# Patient Record
Sex: FEMALE | Race: ASIAN | NOT HISPANIC OR LATINO | Employment: UNEMPLOYED | URBAN - METROPOLITAN AREA
[De-identification: names, ages, dates, MRNs, and addresses within clinical notes are randomized per-mention and may not be internally consistent; named-entity substitution may affect disease eponyms.]

---

## 2017-01-05 ENCOUNTER — ROUTINE PRENATAL (OUTPATIENT)
Dept: OBGYN | Facility: CLINIC | Age: 32
End: 2017-01-05

## 2017-01-05 VITALS — SYSTOLIC BLOOD PRESSURE: 122 MMHG | DIASTOLIC BLOOD PRESSURE: 70 MMHG | WEIGHT: 139 LBS | BODY MASS INDEX: 25.42 KG/M2

## 2017-01-05 DIAGNOSIS — Z34.82 ENCOUNTER FOR SUPERVISION OF OTHER NORMAL PREGNANCY IN SECOND TRIMESTER: ICD-10-CM

## 2017-01-05 PROCEDURE — 90040 PR PRENATAL FOLLOW UP: CPT | Performed by: NURSE PRACTITIONER

## 2017-01-05 NOTE — MR AVS SNAPSHOT
Felisha Mendoza   2017 8:30 AM   Routine Prenatal   MRN: [id number]    [facility]   Dept Phone:  [phone]    Description:  Female : 1985   Provider:  Marybel Hackett D.N.P.           Allergies as of 2017     No Known Allergies      Vital Signs     Blood Pressure Weight Last Menstrual Period Smoking Status          122/70 mmHg 63.05 kg (139 lb) 2016 Never Smoker         Basic Information     Date Of Birth Sex Race Ethnicity Preferred Language    1985 Female  Non- English      Your appointments     2017  8:30 AM   OB Follow Up with NIRU Simpson   [facility])    [address] [place] [address] [address]  [facility] [address] [phone]              Problem List              ICD-10-CM Priority Class Noted - Resolved    Supervision of normal pregnancy in second trimester Z34.92   2016 - Present      Health Maintenance        Date Due Completion Dates    IMM INFLUENZA (1) 2016 ---    PAP SMEAR 2019    IMM DTaP/Tdap/Td Vaccine (2 - Td) 2026            Current Immunizations     Tdap Vaccine 2016  9:24 AM      Below and/or attached are the medications your provider expects you to take. Review all of your home medications and newly ordered medications with your provider and/or pharmacist. Follow medication instructions as directed by your provider and/or pharmacist. Please keep your medication list with you and share with your provider. Update the information when medications are discontinued, doses are changed, or new medications (including over-the-counter products) are added; and carry medication information at all times in the event of emergency situations     Allergies:  No Known Allergies          Medications  Valid as of: 2017 -  8:48 AM    Generic Name Brand Name Tablet Size Instructions for use    Prenatal MV-Min-Fe Fum-FA-DHA   Take  by mouth.        .                 Medicines  prescribed today were sent to:     [id number] - [place], [place] - [address] AT [facility]    [address] [place] [address]    Phone: [phone] Fax: [phone]    Open 24 Hours?: No      Medication refill instructions:       If your prescription bottle indicates you have medication refills left, it is not necessary to call your provider’s office. Please contact your pharmacy and they will refill your medication.    If your prescription bottle indicates you do not have any refills left, you may request refills at any time through one of the following ways: The online [employer] system (except Urgent Care), by calling your provider’s office, or by asking your pharmacy to contact your provider’s office with a refill request. Medication refills are processed only during regular business hours and may not be available until the next business day. Your provider may request additional information or to have a follow-up visit with you prior to refilling your medication.   *Please Note: Medication refills are assigned a new Rx number when refilled electronically. Your pharmacy may indicate that no refills were authorized even though a new prescription for the same medication is available at the pharmacy. Please request the medicine by name with the pharmacy before contacting your provider for a refill.        Other Notes About Your Plan     Transfer of care from [place] - all records in media           [employer] Access Code: [email or web address]  Expires: 1/28/2017  9:30 AM    [employer]  A secure, online tool to manage your health information     [employer]’s [employer]® is a secure, online tool that connects you to your personalized health information from the privacy of your home -- day or night - making it very easy for you to manage your healthcare. Once the activation process is completed, you can even access your medical information using the [employer] lea, which is  available for free in the Apple Tracey store or Google Play store.     [employer] provides the following levels of access (as shown below):   My Chart Features   [facility] Doctor [facility]  Specialists [facility]  Urgent  Care Non-[facility]  Primary Care  Doctor   Email your healthcare team securely and privately 24/7 X X X    Manage appointments: schedule your next appointment; view details of past/upcoming appointments X      Request prescription refills. X      View recent personal medical records, including lab and immunizations X X X X   View health record, including health history, allergies, medications X X X X   Read reports about your outpatient visits, procedures, consult and ER notes X X X X   See your discharge summary, which is a recap of your hospital and/or ER visit that includes your diagnosis, lab results, and care plan. X X       How to register for [employer]:  1. Go to  [email or web address].  2. Click on the Sign Up Now box, which takes you to the New Member Sign Up page. You will need to provide the following information:  a. Enter your [employer] Access Code exactly as it appears at the top of this page. (You will not need to use this code after you’ve completed the sign-up process. If you do not sign up before the expiration date, you must request a new code.)   b. Enter your date of birth.   c. Enter your home email address.   d. Click Submit, and follow the next screen’s instructions.  3. Create a [employer] ID. This will be your [employer] login ID and cannot be changed, so think of one that is secure and easy to remember.  4. Create a [employer] password. You can change your password at any time.  5. Enter your Password Reset Question and Answer. This can be used at a later time if you forget your password.   6. Enter your e-mail address. This allows you to receive e-mail notifications when new information is available in [employer].  7. Click Sign Up. You can now view your health information.    For  assistance activating your [employer] account, call [phone]

## 2017-01-05 NOTE — PROGRESS NOTES
Pt here for OB F/V. Good fetal movement. Denies LOF, VB.  CO pelvic pressure  CO increased discharge

## 2017-01-05 NOTE — PROGRESS NOTES
S) Pt is a 31 y.o.   at 38w6d  gestation. Routine prenatal care today. States no specific problems except for vaginal pressure. No consistent UC's. Patient is concerned because she lives in [place].  Reports good  fetal movement. Denies bleeding or leaking of fluid. Denies dysuria. Generally feels well today. Good self-care activities identified. Denies headaches.     O) see flow         Filed Vitals:    17 0836   BP: 122/70   Weight: 63.05 kg (139 lb)           Lab: Normal prenatal panel, neg GBS, normal glucose.        Pertinent ultrasound - Normal fetal survey            A) IUP at 38w6d       S=D         Patient Active Problem List    Diagnosis Date Noted   • Supervision of normal pregnancy in second trimester 2016     P) s/s labor vs general discomforts. Fetal movements reviewed. General ed and anticipatory guidance. Nutrition/exercise/vitamin. Plans breast.  Plans pp contraception - condoms. It might be beneficial for this patient to stay in town for the next few days due to living in [place] and inclement weather. Flu shot recommended. Continue PNV.

## 2017-01-06 ENCOUNTER — HOSPITAL ENCOUNTER (INPATIENT)
Facility: MEDICAL CENTER | Age: 32
LOS: 2 days | End: 2017-01-08
Attending: OBSTETRICS & GYNECOLOGY | Admitting: OBSTETRICS & GYNECOLOGY
Payer: MEDICAID

## 2017-01-06 LAB
BASOPHILS # BLD AUTO: 0.4 % (ref 0–1.8)
BASOPHILS # BLD: 0.02 K/UL (ref 0–0.12)
EOSINOPHIL # BLD AUTO: 0.01 K/UL (ref 0–0.51)
EOSINOPHIL NFR BLD: 0.2 % (ref 0–6.9)
ERYTHROCYTE [DISTWIDTH] IN BLOOD BY AUTOMATED COUNT: 41.5 FL (ref 35.9–50)
HCT VFR BLD AUTO: 30.9 % (ref 37–47)
HGB BLD-MCNC: 9.6 G/DL (ref 12–16)
HOLDING TUBE BB 8507: NORMAL
IMM GRANULOCYTES # BLD AUTO: 0.02 K/UL (ref 0–0.11)
IMM GRANULOCYTES NFR BLD AUTO: 0.4 % (ref 0–0.9)
LYMPHOCYTES # BLD AUTO: 1.27 K/UL (ref 1–4.8)
LYMPHOCYTES NFR BLD: 23.1 % (ref 22–41)
MCH RBC QN AUTO: 25.1 PG (ref 27–33)
MCHC RBC AUTO-ENTMCNC: 31.1 G/DL (ref 33.6–35)
MCV RBC AUTO: 80.7 FL (ref 81.4–97.8)
MONOCYTES # BLD AUTO: 0.36 K/UL (ref 0–0.85)
MONOCYTES NFR BLD AUTO: 6.5 % (ref 0–13.4)
NEUTROPHILS # BLD AUTO: 3.82 K/UL (ref 2–7.15)
NEUTROPHILS NFR BLD: 69.4 % (ref 44–72)
NRBC # BLD AUTO: 0 K/UL
NRBC BLD AUTO-RTO: 0 /100 WBC
PLATELET # BLD AUTO: 215 K/UL (ref 164–446)
PMV BLD AUTO: 9.7 FL (ref 9–12.9)
RBC # BLD AUTO: 3.83 M/UL (ref 4.2–5.4)
WBC # BLD AUTO: 5.5 K/UL (ref 4.8–10.8)

## 2017-01-06 PROCEDURE — 770007 HCHG ROOM/CARE - OB SEMI PRIVATE (*

## 2017-01-06 PROCEDURE — 700111 HCHG RX REV CODE 636 W/ 250 OVERRIDE (IP): Performed by: NURSE PRACTITIONER

## 2017-01-06 PROCEDURE — 85025 COMPLETE CBC W/AUTO DIFF WBC: CPT

## 2017-01-06 PROCEDURE — 700111 HCHG RX REV CODE 636 W/ 250 OVERRIDE (IP)

## 2017-01-06 RX ORDER — LIDOCAINE HYDROCHLORIDE 10 MG/ML
10 INJECTION, SOLUTION EPIDURAL; INFILTRATION; INTRACAUDAL; PERINEURAL
Status: DISCONTINUED | OUTPATIENT
Start: 2017-01-06 | End: 2017-01-07

## 2017-01-06 RX ORDER — SODIUM CHLORIDE, SODIUM LACTATE, POTASSIUM CHLORIDE, CALCIUM CHLORIDE 600; 310; 30; 20 MG/100ML; MG/100ML; MG/100ML; MG/100ML
INJECTION, SOLUTION INTRAVENOUS
Status: COMPLETED
Start: 2017-01-06 | End: 2017-01-06

## 2017-01-06 RX ORDER — ONDANSETRON 4 MG/1
4 TABLET, ORALLY DISINTEGRATING ORAL EVERY 6 HOURS PRN
Status: DISCONTINUED | OUTPATIENT
Start: 2017-01-06 | End: 2017-01-07

## 2017-01-06 RX ORDER — ONDANSETRON 2 MG/ML
4 INJECTION INTRAMUSCULAR; INTRAVENOUS EVERY 6 HOURS PRN
Status: DISCONTINUED | OUTPATIENT
Start: 2017-01-06 | End: 2017-01-07

## 2017-01-06 RX ORDER — SODIUM CHLORIDE, SODIUM LACTATE, POTASSIUM CHLORIDE, CALCIUM CHLORIDE 600; 310; 30; 20 MG/100ML; MG/100ML; MG/100ML; MG/100ML
INJECTION, SOLUTION INTRAVENOUS CONTINUOUS
Status: DISPENSED | OUTPATIENT
Start: 2017-01-06 | End: 2017-01-07

## 2017-01-06 RX ORDER — MISOPROSTOL 200 UG/1
600 TABLET ORAL
Status: DISCONTINUED | OUTPATIENT
Start: 2017-01-06 | End: 2017-01-07

## 2017-01-06 RX ORDER — IBUPROFEN 600 MG/1
600 TABLET ORAL EVERY 6 HOURS PRN
Status: DISCONTINUED | OUTPATIENT
Start: 2017-01-06 | End: 2017-01-07

## 2017-01-06 RX ORDER — ALUMINA, MAGNESIA, AND SIMETHICONE 2400; 2400; 240 MG/30ML; MG/30ML; MG/30ML
30 SUSPENSION ORAL EVERY 6 HOURS PRN
Status: DISCONTINUED | OUTPATIENT
Start: 2017-01-06 | End: 2017-01-07

## 2017-01-06 RX ORDER — OXYCODONE HYDROCHLORIDE AND ACETAMINOPHEN 5; 325 MG/1; MG/1
1 TABLET ORAL EVERY 4 HOURS PRN
Status: DISCONTINUED | OUTPATIENT
Start: 2017-01-06 | End: 2017-01-07

## 2017-01-06 RX ORDER — TERBUTALINE SULFATE 1 MG/ML
0.25 INJECTION, SOLUTION SUBCUTANEOUS PRN
Status: DISCONTINUED | OUTPATIENT
Start: 2017-01-06 | End: 2017-01-07

## 2017-01-06 RX ORDER — OXYCODONE HYDROCHLORIDE AND ACETAMINOPHEN 5; 325 MG/1; MG/1
2 TABLET ORAL EVERY 4 HOURS PRN
Status: DISCONTINUED | OUTPATIENT
Start: 2017-01-06 | End: 2017-01-07

## 2017-01-06 RX ADMIN — Medication 1 MILLI-UNITS/MIN: at 21:44

## 2017-01-06 RX ADMIN — SODIUM CHLORIDE, POTASSIUM CHLORIDE, SODIUM LACTATE AND CALCIUM CHLORIDE 1000 ML: 600; 310; 30; 20 INJECTION, SOLUTION INTRAVENOUS at 21:30

## 2017-01-06 ASSESSMENT — LIFESTYLE VARIABLES
ALCOHOL_USE: NO
EVER_SMOKED: NEVER
DO YOU DRINK ALCOHOL: NO

## 2017-01-06 NOTE — IP AVS SNAPSHOT
After Visit Summary                                                                                                                Felisha Mendoza   MRN: [id number]    Department:  POST PARTUM 31   2017           Your appointments     2017  8:30 AM   OB Follow Up with NIRU Simpson   [facility] ([facility])    [address] [place] [address] [address]  [place] [address]   [phone]              Follow-up Information     1. Follow up with [facility], M.D.. Schedule an appointment as soon as possible for a visit in 5 weeks.    Specialty:  OB/Gyn    Contact information    [address] [place] [address]  [address]  [facility] [address]  [phone]         I assume responsibility for securing a follow-up [place] Screening blood test on my baby within the specified date range.    -                  Discharge Instructions       POSTPARTUM DISCHARGE INSTRUCTIONS FOR MOM    YOB: 1985   Age: 31 y.o.               Admit Date: 2017     Discharge Date: 2017  Attending Doctor:  Danita Briceño M.D.                  Allergies:  Review of patient's allergies indicates no known allergies.    Discharged to home by car. Discharged via wheelchair, hospital escort: Yes.  Special equipment needed: Not Applicable  Belongings with: Personal  Be sure to schedule a follow-up appointment with your primary care doctor or any specialists as instructed.     Discharge Plan:   Diet Plan: Discussed  Activity Level: Discussed  Confirmed Follow up Appointment: Patient to Call and Schedule Appointment  Confirmed Symptoms Management: Discussed  Medication Reconciliation Updated: Yes  Influenza Vaccine Indication: Indicated: 9 to 64 years of age  Influenza Vaccine Given - only chart on this line when given: Influenza Vaccine Given (See MAR)    REASONS TO CALL YOUR OBSTETRICIAN:  1.   Persistent fever or shaking chills (Temperature higher than 100.4)  2.   Heavy bleeding (soaking more than 1 pad per hour); Passing clots  3.   Foul odor from vagina  4.    "Mastitis (Breast infection; breast pain, chills, fever, redness)  5.   Urinary pain, burning or frequency  6.   Episiotomy infection    8.   Severe depression longer than 24 hours    HAND WASHING  · Prior to handling the baby.  · Before breastfeeding or bottle feeding baby.  · After using the bathroom or changing the baby's diaper.    VAGINAL CARE  · Nothing inside vagina for 6 weeks: no sexual intercourse, tampons or douching.  · Bleeding may continue for 2-4 weeks.  Amount may vary.    · Call your physician for heavy bleeding which means soaking more than 1 pad per hour    BIRTH CONTROL  · It is possible to become pregnant at any time after delivery and while breastfeeding.  · Plan to discuss a method of birth control with your physician at your follow up visit. visit.    DIET AND ELIMINATION  · Eating more fiber (bran cereal, fruits, and vegetables) and drinking plenty of fluids will help to avoid constipation.  · Urinary frequency after childbirth is normal.    POSTPARTUM BLUES  During the first few days after birth, you may experience a sense of the \"blues\" which may include impatience, irritability or even crying.  These feeling come and go quickly.  However, as many as 1 in 10 women experience emotional symptoms known as postpartum depression.    Postpartum depression:  May start as early as the second or third day after delivery or take several weeks or months to develop.  Symptoms of \"blues\" are present, but are more intense:  Crying spells; loss of appetite; feelings of hopelessness or loss of control; fear of touching the baby; over concern or no concern at all about the baby; little or no concern about your own appearance/caring for yourself; and/or inability to sleep or excessive sleeping.  Contact your physician if you are experiencing any of these symptoms.    Crisis Hotline:  · National Crisis Hotline:  0-244-XLMQATK  Or 1-812.291.5164  · [place] Crisis Hotline:  1-817.450.5019  Or " "553.187.8641    PREVENTING SHAKEN BABY:  If you are angry or stressed, PUT THE BABY IN THE CRIB, step away, take some deep breaths, and wait until you are calm to care for the baby.  DO NOT SHAKE THE BABY.  You are not alone, call a supporter for help.    · Crisis Call Center 24/7 crisis line 008-151-5627 or 1-889.777.7876  · You can also text them, text \"ANSWER\" to 728296    QUIT SMOKING/TOBACCO USE:  I understand the use of any tobacco products increases my chance of suffering from future heart disease and could cause other illnesses which may shorten my life. Quitting the use of tobacco products is the single most important thing I can do to improve my health. For further information on smoking / tobacco cessation call a Toll Free Quit Line at 1-526.596.8712 (*National [employer]) or 1-131.555.1585 (American Lung Association) or you can access the web based program at www.lungusa.org.    · [place] Tobacco Users Help Line:  (846) 693-8267       Toll Free: 1-212.863.9850  · Quit Tobacco Program [facility] (723)507-4021    DEPRESSION / SUICIDE RISK:  As you are discharged from this [facility], it is important to learn how to keep safe from harming yourself.    Recognize the warning signs:  · Abrupt changes in personality, positive or negative- including increase in energy   · Giving away possessions  · Change in eating patterns- significant weight changes-  positive or negative  · Change in sleeping patterns- unable to sleep or sleeping all the time   · Unwillingness or inability to communicate  · Depression  · Unusual sadness, discouragement and loneliness  · Talk of wanting to die  · Neglect of personal appearance   · Rebelliousness- reckless behavior  · Withdrawal from people/activities they love  · Confusion- inability to concentrate     If you or a loved one observes any of these behaviors or has concerns about self-harm, here's what you can do:  · Talk about it- your " feelings and reasons for harming yourself  · Remove any means that you might use to hurt yourself (examples: pills, rope, extension cords, firearm)  · Get professional help from the community (Mental Health, Substance Abuse, psychological counseling)  · Do not be alone:Call your Safe Contact- someone whom you trust who will be there for you.  · Call your local CRISIS HOTLINE 570-9371 or 298-635-3520  · Call your local Children's Mobile Crisis Response Team [place] (551) 209-5992 or wwwRedLasso  · Call the toll free National Suicide Prevention Hotlines   · National Suicide Prevention Lifeline 211-927-ZEXB (2720)  · National Hope Line Network 800-SUICIDE (356-4633)    DISCHARGE SURVEY:  Thank you for choosing [facility].  We hope we provided you with very good care.  You may be receiving a survey in the mail.  Please fill it out.  Your opinion is valuable to us.    ADDITIONAL EDUCATIONAL MATERIALS GIVEN TO PATIENT:        My signature on this form indicates that:  1.  I have reviewed and understand the above information  2.  My questions regarding this information have been answered to my satisfaction.  3.  I have formulated a plan with my discharge nurse to obtain my prescribed medication for home.         Discharge Medication Instructions:    Below are the medications your physician expects you to take upon discharge:    Review all your home medications and newly ordered medications with your doctor and/or pharmacist. Follow medication instructions as directed by your doctor and/or pharmacist.    Please keep your medication list with you and share with your physician.               Medication List      START taking these medications        Instructions     MG Caps    Take 100 mg by mouth 2 times a day as needed for Constipation.   Dose:  100 mg       ferrous sulfate 325 (65 FE) MG tablet   Last time this was given:  325 mg on 1/8/2017  8:06 AM    Take 1 Tab by mouth every morning with  breakfast.   Dose:  325 mg       ibuprofen 800 MG Tabs   Last time this was given:  800 mg on 1/8/2017  5:53 AM   Commonly known as:  MOTRIN    Take 1 Tab by mouth every 8 hours as needed (Cramping).   Dose:  800 mg         CONTINUE taking these medications        Instructions    PRENATAL 1 PO    Take  by mouth.               Crisis Hotline:     [place] Crisis Hotline:  5-957-UAQAAIQ or 1-605.783.6966    [place] Crisis Hotline:    1-234.358.6020 or 166-573-4364        Disclaimer           _____________________________________                     __________       ________       Patient/Mother Signature or Legal                          Date                   Time

## 2017-01-06 NOTE — IP AVS SNAPSHOT
[employer] Access Code: [email or web address]  Expires: 1/28/2017  9:30 AM    Your email address is not on file at [employer].  Email Addresses are required for you to sign up for [employer], please contact [phone] to verify your personal information and to provide your email address prior to attempting to register for [employer].    Felisha Mendoza  [facility][note 3] [address] [place] [address], [place] [address]    [employer]  A secure, online tool to manage your health information     [employer]’s [employer]® is a secure, online tool that connects you to your personalized health information from the privacy of your home -- day or night - making it very easy for you to manage your healthcare. Once the activation process is completed, you can even access your medical information using the [employer] tracye, which is available for free in the Apple Tracey store or Google Play store.     To learn more about [employer], visit [email or web address]    There are two levels of access available (as shown below):   My Chart Features  [facility] Primary Care Doctor [facility]  Specialists [facility]  Urgent  Care Non-[facility] Primary Care Doctor   Email your healthcare team securely and privately 24/7 X X X    Manage appointments: schedule your next appointment; view details of past/upcoming appointments X      Request prescription refills. X      View recent personal medical records, including lab and immunizations X X X X   View health record, including health history, allergies, medications X X X X   Read reports about your outpatient visits, procedures, consult and ER notes X X X X   See your discharge summary, which is a recap of your hospital and/or ER visit that includes your diagnosis, lab results, and care plan X X  X     How to register for [employer]:  Once your e-mail address has been verified, follow the following steps to sign up for [employer].     1. Go to  [email or web address]  2. Click on the Sign Up Now box, which takes you to the New Member Sign Up  page. You will need to provide the following information:  a. Enter your [employer] Access Code exactly as it appears at the top of this page. (You will not need to use this code after you’ve completed the sign-up process. If you do not sign up before the expiration date, you must request a new code.)   b. Enter your date of birth.   c. Enter your home email address.   d. Click Submit, and follow the next screen’s instructions.  3. Create a [employer] ID. This will be your [employer] login ID and cannot be changed, so think of one that is secure and easy to remember.  4. Create a [employer] password. You can change your password at any time.  5. Enter your Password Reset Question and Answer. This can be used at a later time if you forget your password.   6. Enter your e-mail address. This allows you to receive e-mail notifications when new information is available in [employer].  7. Click Sign Up. You can now view your health information.    For assistance activating your [employer] account, call [phone]

## 2017-01-06 NOTE — IP AVS SNAPSHOT
1/8/2017          Felisha Mendoza  [address]  [facility] [address]    Dear Felisha:    [facility] wants to ensure your discharge home is safe and you or your loved ones have had all your questions answered regarding your care after you leave the hospital.    You may receive a telephone call within two days of your discharge.  This call is to make certain you understand your discharge instructions as well as ensure we provided you with the best care possible during your stay with us.     The call will only last approximately 3-5 minutes and will be done by a nurse.    Once again, we want to ensure your discharge home is safe and that you have a clear understanding of any next steps in your care.  If you have any questions or concerns, please do not hesitate to contact us, we are here for you.  Thank you for choosing [facility] for your healthcare needs.    Sincerely,    Alexis Saucedo    [facility]

## 2017-01-07 PROCEDURE — 90471 IMMUNIZATION ADMIN: CPT

## 2017-01-07 PROCEDURE — 700102 HCHG RX REV CODE 250 W/ 637 OVERRIDE(OP): Performed by: NURSE PRACTITIONER

## 2017-01-07 PROCEDURE — 59409 OBSTETRICAL CARE: CPT

## 2017-01-07 PROCEDURE — 770007 HCHG ROOM/CARE - OB SEMI PRIVATE (*

## 2017-01-07 PROCEDURE — 700111 HCHG RX REV CODE 636 W/ 250 OVERRIDE (IP): Performed by: NURSE PRACTITIONER

## 2017-01-07 PROCEDURE — A9270 NON-COVERED ITEM OR SERVICE: HCPCS | Performed by: NURSE PRACTITIONER

## 2017-01-07 PROCEDURE — 90686 IIV4 VACC NO PRSV 0.5 ML IM: CPT | Performed by: OBSTETRICS & GYNECOLOGY

## 2017-01-07 PROCEDURE — 0HQ9XZZ REPAIR PERINEUM SKIN, EXTERNAL APPROACH: ICD-10-PCS | Performed by: OBSTETRICS & GYNECOLOGY

## 2017-01-07 PROCEDURE — 304965 HCHG RECOVERY SERVICES

## 2017-01-07 PROCEDURE — 700111 HCHG RX REV CODE 636 W/ 250 OVERRIDE (IP)

## 2017-01-07 PROCEDURE — 700111 HCHG RX REV CODE 636 W/ 250 OVERRIDE (IP): Performed by: OBSTETRICS & GYNECOLOGY

## 2017-01-07 PROCEDURE — 700101 HCHG RX REV CODE 250

## 2017-01-07 PROCEDURE — 36415 COLL VENOUS BLD VENIPUNCTURE: CPT

## 2017-01-07 PROCEDURE — 303615 HCHG EPIDURAL/SPINAL ANESTHESIA FOR LABOR

## 2017-01-07 PROCEDURE — 3E0234Z INTRODUCTION OF SERUM, TOXOID AND VACCINE INTO MUSCLE, PERCUTANEOUS APPROACH: ICD-10-PCS | Performed by: OBSTETRICS & GYNECOLOGY

## 2017-01-07 RX ORDER — MAG HYDROX/ALUMINUM HYD/SIMETH 400-400-40
1 SUSPENSION, ORAL (FINAL DOSE FORM) ORAL
Status: DISCONTINUED | OUTPATIENT
Start: 2017-01-07 | End: 2017-01-08 | Stop reason: HOSPADM

## 2017-01-07 RX ORDER — DOCUSATE SODIUM 100 MG/1
100 CAPSULE, LIQUID FILLED ORAL 2 TIMES DAILY PRN
Status: DISCONTINUED | OUTPATIENT
Start: 2017-01-07 | End: 2017-01-08 | Stop reason: HOSPADM

## 2017-01-07 RX ORDER — BISACODYL 10 MG
10 SUPPOSITORY, RECTAL RECTAL PRN
Status: DISCONTINUED | OUTPATIENT
Start: 2017-01-07 | End: 2017-01-08 | Stop reason: HOSPADM

## 2017-01-07 RX ORDER — OXYCODONE HYDROCHLORIDE AND ACETAMINOPHEN 5; 325 MG/1; MG/1
2 TABLET ORAL EVERY 4 HOURS PRN
Status: DISCONTINUED | OUTPATIENT
Start: 2017-01-07 | End: 2017-01-08 | Stop reason: HOSPADM

## 2017-01-07 RX ORDER — ACETAMINOPHEN 325 MG/1
650 TABLET ORAL EVERY 4 HOURS PRN
Status: DISCONTINUED | OUTPATIENT
Start: 2017-01-07 | End: 2017-01-08 | Stop reason: HOSPADM

## 2017-01-07 RX ORDER — OXYCODONE HYDROCHLORIDE AND ACETAMINOPHEN 5; 325 MG/1; MG/1
1 TABLET ORAL EVERY 4 HOURS PRN
Status: DISCONTINUED | OUTPATIENT
Start: 2017-01-07 | End: 2017-01-08 | Stop reason: HOSPADM

## 2017-01-07 RX ORDER — VITAMIN A ACETATE, BETA CAROTENE, ASCORBIC ACID, CHOLECALCIFEROL, .ALPHA.-TOCOPHEROL ACETATE, DL-, THIAMINE MONONITRATE, RIBOFLAVIN, NIACINAMIDE, PYRIDOXINE HYDROCHLORIDE, FOLIC ACID, CYANOCOBALAMIN, CALCIUM CARBONATE, FERROUS FUMARATE, ZINC OXIDE, CUPRIC OXIDE 3080; 12; 120; 400; 1; 1.84; 3; 20; 22; 920; 25; 200; 27; 10; 2 [IU]/1; UG/1; MG/1; [IU]/1; MG/1; MG/1; MG/1; MG/1; MG/1; [IU]/1; MG/1; MG/1; MG/1; MG/1; MG/1
1 TABLET, FILM COATED ORAL EVERY MORNING
Status: DISCONTINUED | OUTPATIENT
Start: 2017-01-08 | End: 2017-01-08 | Stop reason: HOSPADM

## 2017-01-07 RX ORDER — ROPIVACAINE HYDROCHLORIDE 2 MG/ML
INJECTION, SOLUTION EPIDURAL; INFILTRATION; PERINEURAL
Status: COMPLETED
Start: 2017-01-07 | End: 2017-01-07

## 2017-01-07 RX ORDER — IBUPROFEN 800 MG/1
800 TABLET ORAL EVERY 8 HOURS PRN
Status: DISCONTINUED | OUTPATIENT
Start: 2017-01-07 | End: 2017-01-08 | Stop reason: HOSPADM

## 2017-01-07 RX ORDER — ONDANSETRON 2 MG/ML
4 INJECTION INTRAMUSCULAR; INTRAVENOUS EVERY 6 HOURS PRN
Status: DISCONTINUED | OUTPATIENT
Start: 2017-01-07 | End: 2017-01-08 | Stop reason: HOSPADM

## 2017-01-07 RX ADMIN — OXYCODONE HYDROCHLORIDE AND ACETAMINOPHEN 1 TABLET: 5; 325 TABLET ORAL at 13:48

## 2017-01-07 RX ADMIN — Medication 125 ML/HR: at 05:15

## 2017-01-07 RX ADMIN — OXYCODONE HYDROCHLORIDE AND ACETAMINOPHEN 1 TABLET: 5; 325 TABLET ORAL at 21:29

## 2017-01-07 RX ADMIN — FENTANYL CITRATE: 50 INJECTION, SOLUTION INTRAMUSCULAR; INTRAVENOUS at 01:00

## 2017-01-07 RX ADMIN — SODIUM CHLORIDE, POTASSIUM CHLORIDE, SODIUM LACTATE AND CALCIUM CHLORIDE 1000 ML: 600; 310; 30; 20 INJECTION, SOLUTION INTRAVENOUS at 00:45

## 2017-01-07 RX ADMIN — INFLUENZA A VIRUS A/CALIFORNIA/7/2009 X-179A (H1N1) ANTIGEN (FORMALDEHYDE INACTIVATED), INFLUENZA A VIRUS A/HONG KONG/4801/2014 X-263B (H3N2) ANTIGEN (FORMALDEHYDE INACTIVATED), INFLUENZA B VIRUS B/PHUKET/3073/2013 ANTIGEN (FORMALDEHYDE INACTIVATED), AND INFLUENZA B VIRUS B/BRISBANE/60/2008 ANTIGEN (FORMALDEHYDE INACTIVATED) 0.5 ML: 15; 15; 15; 15 INJECTION, SUSPENSION INTRAMUSCULAR at 16:03

## 2017-01-07 RX ADMIN — ROPIVACAINE HYDROCHLORIDE 200 MG: 2 INJECTION, SOLUTION EPIDURAL; INFILTRATION at 01:01

## 2017-01-07 RX ADMIN — IBUPROFEN 800 MG: 800 TABLET, FILM COATED ORAL at 11:09

## 2017-01-07 RX ADMIN — SODIUM CHLORIDE, POTASSIUM CHLORIDE, SODIUM LACTATE AND CALCIUM CHLORIDE 1000 ML: 600; 310; 30; 20 INJECTION, SOLUTION INTRAVENOUS at 04:33

## 2017-01-07 ASSESSMENT — PAIN SCALES - GENERAL
PAINLEVEL_OUTOF10: 5
PAINLEVEL_OUTOF10: 5
PAINLEVEL_OUTOF10: 6
PAINLEVEL_OUTOF10: 0
PAINLEVEL_OUTOF10: 2

## 2017-01-07 NOTE — PROGRESS NOTES
L&D Progress Note    Name:   Felisha Mendoza   Date/Time:  1/7/2017 2:27 AM  Gestational Age:  39w1d  Admit Date:   1/6/2017  Admitting Dx:   Pregnancy  Indication for care in labor or delivery    Subjective:  Uterine contractions: yes  Pain:    no  Complaints:   no   Headache: .  no  Blurred vision:  no   SOB:    no   Nausea/vomiting:  no  Epigastric pain:  no  Fetal movement:  normal  Vaginal bleeding: . no    Objective:   Filed Vitals:    01/07/17 0127 01/07/17 0133 01/07/17 0136 01/07/17 0141   BP: 114/65 107/63 109/71 118/73   Pulse: 73 70 71 75   Temp:       Height:       Weight:       SpO2:         Fetal heart variability: moderate  Fetal Heart Rate decelerations: none  Fetal Heart Rate accelerations: yes  Baseline FHR: 130 per minute  Uterine contractions: regular, every 2-8 minutes  Cervical: 90 ;  Dilatation .7 ; Station negative1    Fetal position:   Cephalic  Membranes ruptured: yes  AROM:  yes  Meconium: .  no    IUPC: .   no  FSE .   no    Meds:   Epidural : .  yes  Magnesium sulfate: . no  Pitocin: .  yes    Labs:  Recent Results (from the past 72 hour(s))   POC URINALYSIS    Collection Time: 01/06/17 10:43 AM   Result Value Ref Range    POC Color yellow Negative    POC Appearance clear Negative    POC Glucose neg Negative mg/dL    POC Ketones neg Negative mg/dL    POC Specific Gravity 1.015 <1.005 - >1.030    POC Blood neg Negative    POC Urine PH 7.0 5.0 - 8.0    POC Protein trace Negative mg/dL    POC Nitrites neg Negative    POC Leukocyte Esterase neg Negative   CBC WITH DIFFERENTIAL    Collection Time: 01/06/17 11:27 AM   Result Value Ref Range    WBC 5.5 4.8 - 10.8 K/uL    RBC 3.99 (L) 4.20 - 5.40 M/uL    Hemoglobin 9.9 (L) 12.0 - 16.0 g/dL    Hematocrit 32.7 (L) 37.0 - 47.0 %    MCV 82.0 81.4 - 97.8 fL    MCH 24.8 (L) 27.0 - 33.0 pg    MCHC 30.3 (L) 33.6 - 35.0 g/dL    RDW 41.7 35.9 - 50.0 fL    Platelet Count 200 164 - 446 K/uL    MPV 9.3 9.0 - 12.9 fL    Neutrophils-Polys 68.00 44.00 - 72.00 %     Lymphocytes 24.50 22.00 - 41.00 %    Monocytes 6.00 0.00 - 13.40 %    Eosinophils 0.40 0.00 - 6.90 %    Basophils 0.40 0.00 - 1.80 %    Immature Granulocytes 0.70 0.00 - 0.90 %    Nucleated RBC 0.00 /100 WBC    Neutrophils (Absolute) 3.77 2.00 - 7.15 K/uL    Lymphs (Absolute) 1.36 1.00 - 4.80 K/uL    Monos (Absolute) 0.33 0.00 - 0.85 K/uL    Eos (Absolute) 0.02 0.00 - 0.51 K/uL    Baso (Absolute) 0.02 0.00 - 0.12 K/uL    Immature Granulocytes (abs) 0.04 0.00 - 0.11 K/uL    NRBC (Absolute) 0.00 K/uL   COMP METABOLIC PANEL    Collection Time: 01/06/17 11:27 AM   Result Value Ref Range    Sodium 137 135 - 145 mmol/L    Potassium 4.8 3.6 - 5.5 mmol/L    Chloride 107 96 - 112 mmol/L    Co2 24 20 - 33 mmol/L    Anion Gap 6.0 0.0 - 11.9    Glucose 83 65 - 99 mg/dL    Bun 8 8 - 22 mg/dL    Creatinine 0.51 0.50 - 1.40 mg/dL    Calcium 8.6 8.5 - 10.5 mg/dL    AST(SGOT) 12 12 - 45 U/L    ALT(SGPT) <5 2 - 50 U/L    Alkaline Phosphatase 134 (H) 30 - 99 U/L    Total Bilirubin 0.3 0.1 - 1.5 mg/dL    Albumin 3.4 3.2 - 4.9 g/dL    Total Protein 6.3 6.0 - 8.2 g/dL    Globulin 2.9 1.9 - 3.5 g/dL    A-G Ratio 1.2 g/dL   URIC ACID    Collection Time: 01/06/17 11:27 AM   Result Value Ref Range    Uric Acid 3.9 1.9 - 8.2 mg/dL   ESTIMATED GFR    Collection Time: 01/06/17 11:27 AM   Result Value Ref Range    GFR If African American >60 >60 mL/min/1.73 m 2    GFR If Non African American >60 >60 mL/min/1.73 m 2   Hold blood bank specimen (Not Tested)    Collection Time: 01/06/17  9:30 PM   Result Value Ref Range    Holding Tube - Bb DONE    CBC with Differential    Collection Time: 01/06/17  9:30 PM   Result Value Ref Range    WBC 5.5 4.8 - 10.8 K/uL    RBC 3.83 (L) 4.20 - 5.40 M/uL    Hemoglobin 9.6 (L) 12.0 - 16.0 g/dL    Hematocrit 30.9 (L) 37.0 - 47.0 %    MCV 80.7 (L) 81.4 - 97.8 fL    MCH 25.1 (L) 27.0 - 33.0 pg    MCHC 31.1 (L) 33.6 - 35.0 g/dL    RDW 41.5 35.9 - 50.0 fL    Platelet Count 215 164 - 446 K/uL    MPV 9.7 9.0 - 12.9 fL     Neutrophils-Polys 69.40 44.00 - 72.00 %    Lymphocytes 23.10 22.00 - 41.00 %    Monocytes 6.50 0.00 - 13.40 %    Eosinophils 0.20 0.00 - 6.90 %    Basophils 0.40 0.00 - 1.80 %    Immature Granulocytes 0.40 0.00 - 0.90 %    Nucleated RBC 0.00 /100 WBC    Neutrophils (Absolute) 3.82 2.00 - 7.15 K/uL    Lymphs (Absolute) 1.27 1.00 - 4.80 K/uL    Monos (Absolute) 0.36 0.00 - 0.85 K/uL    Eos (Absolute) 0.01 0.00 - 0.51 K/uL    Baso (Absolute) 0.02 0.00 - 0.12 K/uL    Immature Granulocytes (abs) 0.02 0.00 - 0.11 K/uL    NRBC (Absolute) 0.00 K/uL         Assessment:   Gestational Age:   39w1d  Risk Factors:   PIH  Labor State:    Active phase labor.  Pregnancy Complications: None  Plan:    Continue present management, will reassess SVE in 2 hours or sooner as appopriate    Patient Active Problem List    Diagnosis Date Noted   • Supervision of normal pregnancy in second trimester 08/24/2016     AROM performed  Continue present management  Comfortable with epidural  Will reassess as appropriate    Reviewed by: Marybel Marte, STUDENT

## 2017-01-07 NOTE — CARE PLAN
Problem: Altered physiologic condition related to immediate post-delivery state and potential for bleeding/hemorrhage  Goal: Patient physiologically stable as evidenced by normal lochia, palpable uterine involution and vital signs within normal limits  Outcome: PROGRESSING AS EXPECTED  Fundus firm, lochia light.    Problem: Potential for postpartum infection related to presence of episiotomy/vaginal tear and/or uterine contamination  Goal: Patient will be absent from signs and symptoms of infection  Outcome: PROGRESSING AS EXPECTED  VSS

## 2017-01-07 NOTE — CARE PLAN
Problem: Pain  Goal: Alleviation of Pain or a reduction in pain to the patient’s comfort goal  Intervention: Pain Management - Epidural/Spinal  Pt will have adequate pain relief from epidural during labor      Problem: Risk for Fluid Imbalance  Goal: Promotion of Fluid Balance  Intervention: Administer IV therapy as ordered  Pt will receive 125ml/hr IV LR during labor

## 2017-01-07 NOTE — H&P
"  History and Physical    Felisha Mendoza is a 31 y.o. female  -Para:     Gestational Age:  39w0d  Admitted for:   Induction of Labor  Admitted to  [facility].  Patient received [facility] care: [facility]    HPI: Patient is admitted with the above mentioned Chief Complaint and States   Loss of fluid:   negative  Abdominal Pain:  negative  Uterine Contractions:  positive  Vaginal Bleeding:  negative  Fetal Movement:  normal  Patient denies fever, chills, nausea, vomiting , headache, visual disturbance, or dysuria  Patient's last menstrual period was 2016.  Estimated Date of Delivery: 17  Final BIRGIT: 2017, by Last Menstrual Period    Patient Active Problem List    Diagnosis Date Noted   • Supervision of normal pregnancy in second trimester 2016       Admitting DX: Pregnancy  Indication for care in labor or delivery  Pregnancy Complications:  none  OB Risk Factors:   PIH  Labor State:    Early latent labor.    History:   has no past medical history on file.     has no past surgical history on file.    OB History    Para Term  AB SAB TAB Ectopic Multiple Living   3 2 2       2      # Outcome Date GA Lbr Jc/2nd Weight Sex Delivery Anes PTL Lv   3 Current            2 Term / 40w0d  3.374 kg (7 lb 7 oz) F Vag-Spont EPI N Y   1 Term 13 40w0d  3.204 kg (7 lb 1 oz) M Vag-Spont EPI N Y          Medications:  No current facility-administered medications on file prior to encounter.     Current Outpatient Prescriptions on File Prior to Encounter   Medication Sig Dispense Refill   • Prenatal MV-Min-Fe Fum-FA-DHA (PRENATAL 1 PO) Take  by mouth.         Allergies:  Review of patient's allergies indicates no known allergies.    ROS:   Neuro: negative    Cardiovascular: negative  Gastro intestinal: negative  Genitourinary: negative            Physical Exam:  /79 mmHg  Pulse 96  Temp(Src) 36.6 °C (97.8 °F)  Ht 1.549 m (5' 1\")  Wt 62.596 kg (138 lb) "  BMI 26.09 kg/m2  LMP 04/08/2016  Constitutional: healthy-appearing, Well-developed, in no acute distress  No JVD: while supine  HEENT: EOMI and Neck supple with midline trachea  Breast Exam: negative  Cardio: regular rate and rhythm  Lung: unlabored respirations, no intercostal retractions or accessory muscle use, clear to auscultation without rales or wheezes  Abdomen: abdomen is soft without significant tenderness, masses, organomegaly or guarding  Extremity: extremities, peripheral pulses and reflexes normal, no edema, redness or tenderness in the calves or thighs, feet normal, good pulses, normal color, temperature and sensation    Cervical Exam: 60%  Cervix Dilatation: 4-5  Station: negative 2  Pelvis: Normal  Fetal Assessment: Fetal heart variability: moderate  Fetal Heart Rate decelerations: none  Fetal Heart Rate accelerations: yes  Baseline FHR: 135 per minute  Uterine contractions: Sporadic  Estimated Fetal Weight: 3000 - 3500g      Labs:  Recent Labs      01/06/17   1127   WBC  5.5   RBC  3.99*   HEMOGLOBIN  9.9*   HEMATOCRIT  32.7*   MCV  82.0   MCH  24.8*   MCHC  30.3*   RDW  41.7   PLATELETCT  200   MPV  9.3     Prenatal Results         1st Trimester Date Time   ABO  O  05/18/16    RH  Positive  05/18/16    Antibody  Negative  05/18/16    CBC/PLT/DIFF      HGB  9.9 g/dL (L) 01/06/17 1127   Platelets  200 K/uL 01/06/17 1127   HGB A1C       1 Hr GCT  114 mg/dL 09/29/16 1147   3 Hr GTT      Rubella  Reactive  05/18/16    RPR  Nonreactive  05/18/16    Urine Culture      24 Urine Protein       24 Urine Creatinine       HBsAg  Negative  05/18/16    Hep CAB       HIV  Negative  05/18/16    Gonorrhea  Negative  05/24/16    Chlamydia  Negative  05/24/16    TSH       Free T4        TB      Pap  Negative  05/24/16    SYPHILUS TREP QUAL [id number] [[id number]][      2nd Trimester Date Time   HCT  32.7 % (L) 01/06/17 1127   HGB  9.9 g/dL (L) 01/06/17 1127   1 Hr GCT  114 mg/dL 09/29/16 1147   3 Hr GTT      24-28 Weeks  Date Time   1 Hr GCT   114 mg/dL 16 1147   TSH       Free T4       24 Urine Protein      24 Urine Creatinine      BUN  8 mg/dL 17 1127   Creatinine  0.51 mg/dL 17 1127   GFR  >60 mL/min/1.73 m 2 17 1127   AST  12 U/L 17 1127   ALT  <5 U/L 17 1127   Uric Acid  3.9 mg/dL 17 1127   LDH      3rd Trimester Date Time   HCT  32.7 % (L) 17 1127   HGB  9.9 g/dL (L) 17 1127   TSH      Free T4      24 Hr Urine Protein      24 Hr Urine Creatinine      SYPHILUS TREP QUAL  Non Reactive  16 1147   35-37 Weeks Date Time   GBS PCR LB  Negative  12/15/16 0905   GBS PCR  Negative  12/15/16 09   Genetic Screening Date Time   Cystic Fibrosis      AFP Quad  Normal  16 1512   Sickle Cell                  View all results for this pregnancy            Assessment:  Gestational Age:  39w0d  Labor State:   Labor, Active  Risk Factors:   PIH  Pregnancy Complications: None    Patient Active Problem List    Diagnosis Date Noted   • Supervision of normal pregnancy in second trimester 2016       Plan:   Admitted for: Induction of Labor    CBC  routine urinalysis  Antibiotics: Not indicated at this time    Reviewed by: Marybel Hackett, JAY Marte, STUDENT

## 2017-01-07 NOTE — PROGRESS NOTES
"2100: Pt presents to L&D for IOL.  External monitors x2 applied.  VSS.   2115: SVE /-1  0046: Dr. Tran at bedside for epidural  0100: Test dose  0216: NINI Marte, student nurse midwife, at bedside for SVE and AROM  0219: AROM/clear.  SVE /-1  0438: RN at bedside to check if pt feeling pressure. Pt asking for epidural to be turned down because she doesn't feel pressure and is feeling \"too numb.\"  Baby's head is /delivering, C Lew called to bedside for delivery.  0440:  viable female   0655: Report given to DAVINA Vargas at bedside.  "

## 2017-01-07 NOTE — DELIVERY NOTE
Delivery Note - Lew 1/7/17    Jo gray a 31 yoa G 3 P 2002 at 39 week admitted for induction of labor for living remote from hospital and advanced cervical dilatation. GBS negative very comfortable epidural anesthesia. Called to patient room as head was out, Oquendo bulb deflated rest of baby delivered easily with one push in a RAIMUNDO position at 0440. No nuchal cord. Placed immediately on abdomen with spontaneous cry. Placenta S&I french presentation at 0443. Fundus massaged to firm, pitocin rapid IV. First degree midline vaginal laceration repaired with 3 interrupted sutures. 4-0 vicryl used.  cc. Apgars 8&9. Mother and baby are stable. Anticipate normal pp course.     Dr Briceño attending.

## 2017-01-08 VITALS
OXYGEN SATURATION: 96 % | WEIGHT: 138 LBS | DIASTOLIC BLOOD PRESSURE: 61 MMHG | HEIGHT: 61 IN | BODY MASS INDEX: 26.06 KG/M2 | TEMPERATURE: 98.2 F | SYSTOLIC BLOOD PRESSURE: 108 MMHG | RESPIRATION RATE: 16 BRPM | HEART RATE: 63 BPM

## 2017-01-08 LAB
ERYTHROCYTE [DISTWIDTH] IN BLOOD BY AUTOMATED COUNT: 41.2 FL (ref 35.9–50)
HCT VFR BLD AUTO: 26.5 % (ref 37–47)
HGB BLD-MCNC: 8.1 G/DL (ref 12–16)
MCH RBC QN AUTO: 24.8 PG (ref 27–33)
MCHC RBC AUTO-ENTMCNC: 30.6 G/DL (ref 33.6–35)
MCV RBC AUTO: 81 FL (ref 81.4–97.8)
PLATELET # BLD AUTO: 186 K/UL (ref 164–446)
PMV BLD AUTO: 9.7 FL (ref 9–12.9)
RBC # BLD AUTO: 3.27 M/UL (ref 4.2–5.4)
WBC # BLD AUTO: 6.4 K/UL (ref 4.8–10.8)

## 2017-01-08 PROCEDURE — A9270 NON-COVERED ITEM OR SERVICE: HCPCS | Performed by: NURSE PRACTITIONER

## 2017-01-08 PROCEDURE — A9270 NON-COVERED ITEM OR SERVICE: HCPCS | Performed by: FAMILY MEDICINE

## 2017-01-08 PROCEDURE — 700102 HCHG RX REV CODE 250 W/ 637 OVERRIDE(OP): Performed by: FAMILY MEDICINE

## 2017-01-08 PROCEDURE — 700102 HCHG RX REV CODE 250 W/ 637 OVERRIDE(OP): Performed by: NURSE PRACTITIONER

## 2017-01-08 PROCEDURE — 85027 COMPLETE CBC AUTOMATED: CPT

## 2017-01-08 PROCEDURE — 36415 COLL VENOUS BLD VENIPUNCTURE: CPT

## 2017-01-08 RX ORDER — FERROUS SULFATE 325(65) MG
325 TABLET ORAL
Qty: 30 TAB | Refills: 2 | Status: SHIPPED | OUTPATIENT
Start: 2017-01-08

## 2017-01-08 RX ORDER — FERROUS SULFATE 325(65) MG
325 TABLET ORAL
Status: DISCONTINUED | OUTPATIENT
Start: 2017-01-08 | End: 2017-01-08 | Stop reason: HOSPADM

## 2017-01-08 RX ORDER — PSEUDOEPHEDRINE HCL 30 MG
100 TABLET ORAL 2 TIMES DAILY PRN
Qty: 60 CAP | Refills: 2 | Status: SHIPPED | OUTPATIENT
Start: 2017-01-08

## 2017-01-08 RX ORDER — FERROUS SULFATE 325(65) MG
325 TABLET ORAL
Status: DISCONTINUED | OUTPATIENT
Start: 2017-01-08 | End: 2017-01-08

## 2017-01-08 RX ORDER — IBUPROFEN 800 MG/1
800 TABLET ORAL EVERY 8 HOURS PRN
Qty: 30 TAB | Refills: 2 | Status: SHIPPED | OUTPATIENT
Start: 2017-01-08

## 2017-01-08 RX ADMIN — IBUPROFEN 800 MG: 800 TABLET, FILM COATED ORAL at 05:53

## 2017-01-08 RX ADMIN — OXYCODONE HYDROCHLORIDE AND ACETAMINOPHEN 1 TABLET: 5; 325 TABLET ORAL at 01:49

## 2017-01-08 RX ADMIN — Medication 1 TABLET: at 08:06

## 2017-01-08 RX ADMIN — FERROUS SULFATE TAB 325 MG (65 MG ELEMENTAL FE) 325 MG: 325 (65 FE) TAB at 08:06

## 2017-01-08 RX ADMIN — OXYCODONE HYDROCHLORIDE AND ACETAMINOPHEN 1 TABLET: 5; 325 TABLET ORAL at 05:50

## 2017-01-08 ASSESSMENT — PAIN SCALES - GENERAL
PAINLEVEL_OUTOF10: 0
PAINLEVEL_OUTOF10: 5
PAINLEVEL_OUTOF10: 7
PAINLEVEL_OUTOF10: 0

## 2017-01-08 ASSESSMENT — LIFESTYLE VARIABLES: EVER_SMOKED: NEVER

## 2017-01-08 NOTE — DISCHARGE INSTRUCTIONS
POSTPARTUM DISCHARGE INSTRUCTIONS FOR MOM    YOB: 1985   Age: 31 y.o.               Admit Date: 1/6/2017     Discharge Date: 1/8/2017  Attending Doctor:  Danita Briceño M.D.                  Allergies:  Review of patient's allergies indicates no known allergies.    Discharged to home by car. Discharged via wheelchair, hospital escort: Yes.  Special equipment needed: Not Applicable  Belongings with: Personal  Be sure to schedule a follow-up appointment with your primary care doctor or any specialists as instructed.     Discharge Plan:   Diet Plan: Discussed  Activity Level: Discussed  Confirmed Follow up Appointment: Patient to Call and Schedule Appointment  Confirmed Symptoms Management: Discussed  Medication Reconciliation Updated: Yes  Influenza Vaccine Indication: Indicated: 9 to 64 years of age  Influenza Vaccine Given - only chart on this line when given: Influenza Vaccine Given (See MAR)    REASONS TO CALL YOUR OBSTETRICIAN:  1.   Persistent fever or shaking chills (Temperature higher than 100.4)  2.   Heavy bleeding (soaking more than 1 pad per hour); Passing clots  3.   Foul odor from vagina  4.   Mastitis (Breast infection; breast pain, chills, fever, redness)  5.   Urinary pain, burning or frequency  6.   Episiotomy infection    8.   Severe depression longer than 24 hours    HAND WASHING  · Prior to handling the baby.  · Before breastfeeding or bottle feeding baby.  · After using the bathroom or changing the baby's diaper.    VAGINAL CARE  · Nothing inside vagina for 6 weeks: no sexual intercourse, tampons or douching.  · Bleeding may continue for 2-4 weeks.  Amount may vary.    · Call your physician for heavy bleeding which means soaking more than 1 pad per hour    BIRTH CONTROL  · It is possible to become pregnant at any time after delivery and while breastfeeding.  · Plan to discuss a method of birth control with your physician at your follow up visit. visit.    DIET AND ELIMINATION  · Eating  "more fiber (bran cereal, fruits, and vegetables) and drinking plenty of fluids will help to avoid constipation.  · Urinary frequency after childbirth is normal.    POSTPARTUM BLUES  During the first few days after birth, you may experience a sense of the \"blues\" which may include impatience, irritability or even crying.  These feeling come and go quickly.  However, as many as 1 in 10 women experience emotional symptoms known as postpartum depression.    Postpartum depression:  May start as early as the second or third day after delivery or take several weeks or months to develop.  Symptoms of \"blues\" are present, but are more intense:  Crying spells; loss of appetite; feelings of hopelessness or loss of control; fear of touching the baby; over concern or no concern at all about the baby; little or no concern about your own appearance/caring for yourself; and/or inability to sleep or excessive sleeping.  Contact your physician if you are experiencing any of these symptoms.    Crisis Hotline:  · [place] Crisis Hotline:  6-905-FUMJHVY  Or 1-403.811.4324  · [place] Crisis Hotline:  1-302.624.6110  Or 241-215-4658    PREVENTING SHAKEN BABY:  If you are angry or stressed, PUT THE BABY IN THE CRIB, step away, take some deep breaths, and wait until you are calm to care for the baby.  DO NOT SHAKE THE BABY.  You are not alone, call a supporter for help.    · Crisis Call Center 24/7 crisis line 400-855-0471 or 1-209.270.6629  · You can also text them, text \"ANSWER\" to 469894    QUIT SMOKING/TOBACCO USE:  I understand the use of any tobacco products increases my chance of suffering from future heart disease and could cause other illnesses which may shorten my life. Quitting the use of tobacco products is the single most important thing I can do to improve my health. For further information on smoking / tobacco cessation call a Toll Free Quit Line at 1-508.215.2944 (*[employer] [place]) or 1-728.637.2596 (American Lung " Association) or you can access the web based program at www.lungusa.org.    · [place] Tobacco Users Help Line:  (808) 219-2346       Toll Free: 1-139.484.9828  · Quit Tobacco Program [facility] (097)838-0576    DEPRESSION / SUICIDE RISK:  As you are discharged from this [facility], it is important to learn how to keep safe from harming yourself.    Recognize the warning signs:  · Abrupt changes in personality, positive or negative- including increase in energy   · Giving away possessions  · Change in eating patterns- significant weight changes-  positive or negative  · Change in sleeping patterns- unable to sleep or sleeping all the time   · Unwillingness or inability to communicate  · Depression  · Unusual sadness, discouragement and loneliness  · Talk of wanting to die  · Neglect of personal appearance   · Rebelliousness- reckless behavior  · Withdrawal from people/activities they love  · Confusion- inability to concentrate     If you or a loved one observes any of these behaviors or has concerns about self-harm, here's what you can do:  · Talk about it- your feelings and reasons for harming yourself  · Remove any means that you might use to hurt yourself (examples: pills, rope, extension cords, firearm)  · Get professional help from the community (Mental Health, Substance Abuse, psychological counseling)  · Do not be alone:Call your Safe Contact- someone whom you trust who will be there for you.  · Call your local CRISIS HOTLINE 733-3788 or 462-581-0779  · Call your local Children's Mobile Crisis Response Team [place] (420) 115-2047 or www.[employer]  · Call the toll free National Suicide Prevention Hotlines   · National Suicide Prevention Lifeline 884-332-IFSB (2606)  · National Hope Line Network 800-SUICIDE (176-6792)    DISCHARGE SURVEY:  Thank you for choosing [facility].  We hope we provided you with very good care.  You may be receiving a survey in the mail.   Please fill it out.  Your opinion is valuable to us.    ADDITIONAL EDUCATIONAL MATERIALS GIVEN TO PATIENT:        My signature on this form indicates that:  1.  I have reviewed and understand the above information  2.  My questions regarding this information have been answered to my satisfaction.  3.  I have formulated a plan with my discharge nurse to obtain my prescribed medication for home.

## 2017-01-08 NOTE — PROGRESS NOTES
Report received at 0700. Assessment completed: VSS. Patient ambulates by self. Fundus firm, lochia light rubra. Patient states pain 0/10; declines medication. Pt to request mediation when needed. Bed in lowest locked position. Call light in place. All questions and concerns addressed. Will continue to monitor.

## 2017-01-08 NOTE — PROGRESS NOTES
Patient off unit with infant and spouse and hospital escort at 1245. Discharge instructions reviewed and signed; copy given to patient and placed in chart. F/U appointment discussed. Patient verbalizes understanding.

## 2017-01-08 NOTE — PROGRESS NOTES
Assessment complete. VSS. Fundus firm, lochia light. Pt medicated with PRN pain meds per MAR. FOB at bedside bonding with pt and baby. States voiding without difficulty. POC discussed. Encouraged to call with needs. Call light in place.    Pain management discussed with pt. Pt states she will call for meds PRN.

## 2017-01-08 NOTE — DISCHARGE SUMMARY
[facility]  NORMAL SPONTANEOUS VAGINAL DISCHARGE SUMMARY    PATIENT ID:  NAME:  Felisha Mendoza  MRN:               [id number]  YOB: 1985    DATE OF ADMISSION: 2017    DATE OF DISCHARGE: 2017     ADMITTING DIAGNOSIS: Intrauterine pregnancy at 39w1d.    DISCHARGE DIAGNOSIS: s/p     HOSPITAL COURSE: This is a 31 y.o. year old female admitted at [place] [address] at 39w1d who presented with positive contractions, negative LOF, negative vaginal bleeding, good FM and in active labor. Pt was 4-5 cm dilated, 60% effaced and at  -2 station on sterile vaginal exam. Pregnancy was uncomplicated. The patient had a good labor pattern after admission and proceeded to deliver a viable female infant weighing  6 lbs and 15 oz. Infants Apgars scores were 8 and 9 at one and five minutes. The patients postpartum course was uncomplicated and she was discharged home in stable condition on postpartum day #1.    PROCEDURES PERFORMED: Normal spontaneous vaginal delivery over intact perineum. Upon vaginal exam a First degree midline vaginal laceration repaired with 3 interrupted sutures using 4-0 vicryl in the usual sterile fashion.    COMPLICATIONS: none    DIET: regular    ACTIVITY: No intercourse and nothing inserted into the vagina for 5 weeks.    Exam:  General: Well appearing, NAD  Breast: No significant tenderness or erythem  CV: RRR, no m/r/g  Pulm: CTAB  Abdomen: Fundus firm, otherwise soft with + BS  Lochia: light  Ext: no significant edema, clubbing, or cyanosis      MEDICATIONS:  Current Outpatient Prescriptions   Medication Sig Dispense Refill   • ibuprofen (MOTRIN) 800 MG Tab Take 1 Tab by mouth every 8 hours as needed (Cramping). 30 Tab 2   • docusate sodium 100 MG Cap Take 100 mg by mouth 2 times a day as needed for Constipation. 60 Cap 2   • ferrous sulfate 325 (65 FE) MG tablet Take 1 Tab by mouth every morning with breakfast. 30 Tab 2         FOLLOWUP:  1) The pregnancy center in 5 weeks  2) Return to the hospital if  copious vaginal bleeding or foul smelling discharge is noted    Ean German M.D.

## 2017-01-08 NOTE — CARE PLAN
Problem: Altered physiologic condition related to immediate post-delivery state and potential for bleeding/hemorrhage  Goal: Patient physiologically stable as evidenced by normal lochia, palpable uterine involution and vital signs within normal limits  Outcome: PROGRESSING AS EXPECTED  VSS. Fundus firm. Lochia light.    Problem: Alteration in comfort related to episiotomy, vaginal repair and/or after birth pains  Goal: Patient verbalizes acceptable pain level  Outcome: PROGRESSING AS EXPECTED  Pt medicated with PRN pain meds per MAR.

## 2017-01-08 NOTE — CARE PLAN
Problem: Altered physiologic condition related to immediate post-delivery state and potential for bleeding/hemorrhage  Goal: Patient physiologically stable as evidenced by normal lochia, palpable uterine involution and vital signs within normal limits  Outcome: PROGRESSING AS EXPECTED  Fundal massage per protocol.     Problem: Potential knowledge deficit related to lack of understanding of self and  care  Goal: Patient will verbalize understanding of self and infant care  Outcome: PROGRESSING AS EXPECTED  Discharge education reviewed; transition to home video viewed. Patient verbalizes understanding.

## 2017-02-10 ENCOUNTER — POST PARTUM (OUTPATIENT)
Dept: OBGYN | Facility: CLINIC | Age: 32
End: 2017-02-10
Payer: MEDICAID

## 2017-02-10 VITALS — DIASTOLIC BLOOD PRESSURE: 64 MMHG | BODY MASS INDEX: 24.2 KG/M2 | SYSTOLIC BLOOD PRESSURE: 102 MMHG | WEIGHT: 128 LBS

## 2017-02-10 PROCEDURE — 90050 PR POSTPARTUM VISIT: CPT | Performed by: NURSE PRACTITIONER

## 2017-02-10 NOTE — MR AVS SNAPSHOT
Felisha Mendoza   2/10/2017 2:30 PM   Post Partum   MRN: [id number]    [facility]   Dept Phone:  [phone]    Description:  Female : 1985   Provider:  Marybel Hackett D.N.P.           Allergies as of 2/10/2017     No Known Allergies      Vital Signs     Blood Pressure Weight Last Menstrual Period Smoking Status          102/64 mmHg 58.06 kg (128 lb) 2016 Never Smoker         Basic Information     Date Of Birth Sex Race Ethnicity Preferred Language    1985 Female  Non- English      Health Maintenance        Date Due Completion Dates    PAP SMEAR 2019    IMM DTaP/Tdap/Td Vaccine (2 - Td) 2026            Current Immunizations     Influenza Vaccine Quad Inj (Pf) 2017  4:03 PM    Tdap Vaccine 2016  9:24 AM      Below and/or attached are the medications your provider expects you to take. Review all of your home medications and newly ordered medications with your provider and/or pharmacist. Follow medication instructions as directed by your provider and/or pharmacist. Please keep your medication list with you and share with your provider. Update the information when medications are discontinued, doses are changed, or new medications (including over-the-counter products) are added; and carry medication information at all times in the event of emergency situations     Allergies:  No Known Allergies          Medications  Valid as of: February 10, 2017 -  2:50 PM    Generic Name Brand Name Tablet Size Instructions for use    Docusate Sodium (Cap)  MG Take 100 mg by mouth 2 times a day as needed for Constipation.        Ferrous Sulfate (Tab) ferrous sulfate 325 (65 FE) MG Take 1 Tab by mouth every morning with breakfast.        Ibuprofen (Tab) MOTRIN 800 MG Take 1 Tab by mouth every 8 hours as needed (Cramping).        Prenatal MV-Min-Fe Fum-FA-DHA   Take  by mouth.        .                 Medicines prescribed today were sent  to:     [facility] - [place], [place] - [address] AT [facility]    [address] [place] [address]    Phone: [phone] Fax: [phone]    Open 24 Hours?: No      Medication refill instructions:       If your prescription bottle indicates you have medication refills left, it is not necessary to call your provider’s office. Please contact your pharmacy and they will refill your medication.    If your prescription bottle indicates you do not have any refills left, you may request refills at any time through one of the following ways: The online [employer] system (except Urgent Care), by calling your provider’s office, or by asking your pharmacy to contact your provider’s office with a refill request. Medication refills are processed only during regular business hours and may not be available until the next business day. Your provider may request additional information or to have a follow-up visit with you prior to refilling your medication.   *Please Note: Medication refills are assigned a new Rx number when refilled electronically. Your pharmacy may indicate that no refills were authorized even though a new prescription for the same medication is available at the pharmacy. Please request the medicine by name with the pharmacy before contacting your provider for a refill.        Other Notes About Your Plan     Transfer of care from [place] - all records in media           [employer] Access Code: [email or web address]  Expires: 3/12/2017  2:50 PM    [employer]  A secure, online tool to manage your health information     [employer]’s [employer]® is a secure, online tool that connects you to your personalized health information from the privacy of your home -- day or night - making it very easy for you to manage your healthcare. Once the activation process is completed, you can even access your medical information using the [employer]hor[employer] lea, which is available for free in the Apple  Tracey store or Google Play store.     PaymentWorks provides the following levels of access (as shown below):   My Chart Features   [facility] Doctor [facility]  Specialists [facility]  Urgent  Care Non-[facility]  Primary Care  Doctor   Email your healthcare team securely and privately 24/7 X X X    Manage appointments: schedule your next appointment; view details of past/upcoming appointments X      Request prescription refills. X      View recent personal medical records, including lab and immunizations X X X X   View health record, including health history, allergies, medications X X X X   Read reports about your outpatient visits, procedures, consult and ER notes X X X X   See your discharge summary, which is a recap of your hospital and/or ER visit that includes your diagnosis, lab results, and care plan. X X       How to register for PaymentWorks:  1. Go to  [email or web address].  2. Click on the Sign Up Now box, which takes you to the New Member Sign Up page. You will need to provide the following information:  a. Enter your PaymentWorks Access Code exactly as it appears at the top of this page. (You will not need to use this code after you’ve completed the sign-up process. If you do not sign up before the expiration date, you must request a new code.)   b. Enter your date of birth.   c. Enter your home email address.   d. Click Submit, and follow the next screen’s instructions.  3. Create a PaymentWorks ID. This will be your PaymentWorks login ID and cannot be changed, so think of one that is secure and easy to remember.  4. Create a PaymentWorks password. You can change your password at any time.  5. Enter your Password Reset Question and Answer. This can be used at a later time if you forget your password.   6. Enter your e-mail address. This allows you to receive e-mail notifications when new information is available in PaymentWorks.  7. Click Sign Up. You can now view your health information.    For assistance activating your [employer]  account, call [phone]

## 2017-02-10 NOTE — PROGRESS NOTES
Subjective:      Felisha Mendoza is a 31 y.o. female who presents with No chief complaint on file.            HPI    ROS       Objective:     /64 mmHg  Wt 58.06 kg (128 lb)  LMP 04/08/2016     Physical Exam   Constitutional: She is oriented to person, place, and time. She appears well-developed and well-nourished.   HENT:   Head: Normocephalic.   Eyes: Pupils are equal, round, and reactive to light.   Neck: Normal range of motion. No thyromegaly present.   Cardiovascular: Normal rate and regular rhythm.    Pulmonary/Chest: Effort normal and breath sounds normal.   Abdominal: Soft. Bowel sounds are normal.   Genitourinary: Vagina normal and uterus normal.   I vaginal suture noticed in situ   Musculoskeletal: Normal range of motion.   Neurological: She is alert and oriented to person, place, and time.   Skin: Skin is warm and dry.   Psychiatric: She has a normal mood and affect. Her behavior is normal. Judgment and thought content normal.   Nursing note and vitals reviewed.              Assessment/Plan:     There are no diagnoses linked to this encounter.

## 2017-02-10 NOTE — PROGRESS NOTES
Subjective   Subjective:    Felisha Mendoza is a 31 y.o. female who presents for her postpartum exam s/p  with 3 interrupted sutures to reapproximate a first degree laceration. Her prenatal course was uncomplicated. She denies dysuria, vaginal bleeding, odor, itching or breast problems. She is both breast and bottle feeding. She desires for her spouse to get a vasectomy for her birth control method. Reports no sex prior to this appointment. Eating a regular diet without difficulty. Bowel movement are Normal.  The patient is not having any pain. No current menses. Patient Denies Incisional pain, drainage or redness. Patient denies postpartum depression. Takes her iron occasionally.     Problem List     There are no active problems to display for this patient.      Objective    See PE  Lab: H&H upon discharge 8.1/26.5  Weight - 128 lb  Vitals 102/64    I vaginal suture noticed in situ.     Assessment   Assessment:    1. PP care of lactating women   2. Exam WNL   3. Pap WNL on   4. Desires contraception -  Condoms until vasectomy    Plan   Plan:    1. Breastfeeding support   2. Continue PNV   3. Contraceptive counseling - follow up w health dept or [facility] for ongoing women's health   4. Encouraged condom use   5. Discussed diet, exercise and resumption of sexual activity   6. Preconception guidance for next pregnancy if applicable. No specific risk factors. Folic acid for all women of childbearing age.   7.  Suture will dissolve

## 2017-02-10 NOTE — PROGRESS NOTES
Pt here today for postpartum exam.  Delivery Date 1/7/17  Currently: breast and bottle feeding  BCM: pt declines bc, [facility].   Wt: 128lb  BP: 102/64  Good ph:[phone]  Pt states no complaints.

## 2020-03-14 ENCOUNTER — APPOINTMENT (OUTPATIENT)
Dept: RADIOLOGY | Facility: MEDICAL CENTER | Age: 35
End: 2020-03-14
Attending: EMERGENCY MEDICINE
Payer: COMMERCIAL

## 2020-03-14 ENCOUNTER — HOSPITAL ENCOUNTER (EMERGENCY)
Facility: MEDICAL CENTER | Age: 35
End: 2020-03-14
Attending: EMERGENCY MEDICINE
Payer: COMMERCIAL

## 2020-03-14 VITALS
RESPIRATION RATE: 18 BRPM | DIASTOLIC BLOOD PRESSURE: 88 MMHG | SYSTOLIC BLOOD PRESSURE: 135 MMHG | HEIGHT: 61 IN | TEMPERATURE: 98.4 F | BODY MASS INDEX: 24.55 KG/M2 | OXYGEN SATURATION: 99 % | WEIGHT: 130 LBS | HEART RATE: 91 BPM
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
## 2020-03-14 DIAGNOSIS — S30.1XXA CONTUSION OF ABDOMINAL WALL, INITIAL ENCOUNTER: ICD-10-CM

## 2020-03-14 DIAGNOSIS — S16.1XXA STRAIN OF NECK MUSCLE, INITIAL ENCOUNTER: ICD-10-CM

## 2020-03-14 LAB
BASOPHILS # BLD AUTO: 0.4 % (ref 0–1.8)
BASOPHILS # BLD: 0.03 K/UL (ref 0–0.12)
EOSINOPHIL # BLD AUTO: 0.01 K/UL (ref 0–0.51)
EOSINOPHIL NFR BLD: 0.1 % (ref 0–6.9)
ERYTHROCYTE [DISTWIDTH] IN BLOOD BY AUTOMATED COUNT: 44.6 FL (ref 35.9–50)
HCG SERPL QL: NEGATIVE
HCT VFR BLD AUTO: 38.3 % (ref 37–47)
HGB BLD-MCNC: 12 G/DL (ref 12–16)
IMM GRANULOCYTES # BLD AUTO: 0.02 K/UL (ref 0–0.11)
IMM GRANULOCYTES NFR BLD AUTO: 0.3 % (ref 0–0.9)
LYMPHOCYTES # BLD AUTO: 1.45 K/UL (ref 1–4.8)
LYMPHOCYTES NFR BLD: 19.2 % (ref 22–41)
MCH RBC QN AUTO: 24.6 PG (ref 27–33)
MCHC RBC AUTO-ENTMCNC: 31.3 G/DL (ref 33.6–35)
MCV RBC AUTO: 78.5 FL (ref 81.4–97.8)
MONOCYTES # BLD AUTO: 0.33 K/UL (ref 0–0.85)
MONOCYTES NFR BLD AUTO: 4.4 % (ref 0–13.4)
NEUTROPHILS # BLD AUTO: 5.73 K/UL (ref 2–7.15)
NEUTROPHILS NFR BLD: 75.6 % (ref 44–72)
NRBC # BLD AUTO: 0 K/UL
NRBC BLD-RTO: 0 /100 WBC
PLATELET # BLD AUTO: 342 K/UL (ref 164–446)
PMV BLD AUTO: 9.6 FL (ref 9–12.9)
RBC # BLD AUTO: 4.88 M/UL (ref 4.2–5.4)
WBC # BLD AUTO: 7.6 K/UL (ref 4.8–10.8)

## 2020-03-14 PROCEDURE — 85025 COMPLETE CBC W/AUTO DIFF WBC: CPT

## 2020-03-14 PROCEDURE — 700117 HCHG RX CONTRAST REV CODE 255: Performed by: EMERGENCY MEDICINE

## 2020-03-14 PROCEDURE — 99284 EMERGENCY DEPT VISIT MOD MDM: CPT

## 2020-03-14 PROCEDURE — 84703 CHORIONIC GONADOTROPIN ASSAY: CPT

## 2020-03-14 PROCEDURE — 307740 HCHG GREEN TRAUMA TEAM SERVICES

## 2020-03-14 PROCEDURE — 72125 CT NECK SPINE W/O DYE: CPT

## 2020-03-14 PROCEDURE — 74177 CT ABD & PELVIS W/CONTRAST: CPT

## 2020-03-14 PROCEDURE — 36415 COLL VENOUS BLD VENIPUNCTURE: CPT

## 2020-03-14 RX ADMIN — IOHEXOL 80 ML: 350 INJECTION, SOLUTION INTRAVENOUS at 20:50
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
## 2020-03-15 NOTE — ED NOTES
Pt discharged home. Explained discharge and medication instructions. Questions and comments addressed. Pt verbalized understanding of instructions. Pt advised to follow-up with PCP or return to ED for any new or worsening of symptoms. Pt is ambulating well and steady on feet. VS stable. Pt's family at bedside and will be driving pt home. PIV removed.